# Patient Record
Sex: MALE | URBAN - METROPOLITAN AREA
[De-identification: names, ages, dates, MRNs, and addresses within clinical notes are randomized per-mention and may not be internally consistent; named-entity substitution may affect disease eponyms.]

---

## 2017-11-07 ENCOUNTER — HOSPITAL ENCOUNTER (EMERGENCY)
Facility: MEDICAL CENTER | Age: 27
End: 2017-11-07
Attending: EMERGENCY MEDICINE

## 2017-11-07 VITALS
HEIGHT: 72 IN | WEIGHT: 152.78 LBS | RESPIRATION RATE: 17 BRPM | TEMPERATURE: 98.1 F | BODY MASS INDEX: 20.69 KG/M2 | DIASTOLIC BLOOD PRESSURE: 84 MMHG | OXYGEN SATURATION: 99 % | HEART RATE: 105 BPM | SYSTOLIC BLOOD PRESSURE: 112 MMHG

## 2017-11-07 DIAGNOSIS — Z76.5 DRUG-SEEKING BEHAVIOR: ICD-10-CM

## 2017-11-07 DIAGNOSIS — K02.9 DENTAL CARIES: ICD-10-CM

## 2017-11-07 PROCEDURE — 99282 EMERGENCY DEPT VISIT SF MDM: CPT | Mod: EDC

## 2017-11-07 RX ORDER — AMOXICILLIN 500 MG/1
500 CAPSULE ORAL 3 TIMES DAILY
Qty: 21 CAP | Refills: 0 | Status: SHIPPED | OUTPATIENT
Start: 2017-11-07 | End: 2017-11-14

## 2017-11-07 ASSESSMENT — PAIN SCALES - GENERAL: PAINLEVEL_OUTOF10: 8

## 2017-11-07 NOTE — ED NOTES
Patient left before getting discharge vitals and paperwork, patient called and instructed to return to  his prescription for amoxicillin

## 2017-11-07 NOTE — ED NOTES
Nic Loya 26 y.o. male   Chief Complaint   Patient presents with   • Dental Pain     R upper broken wisdom tooth.          Pt returned to lobby and educated on triage process.  Advised to notify RN with changes or concerns.

## 2017-11-07 NOTE — ED PROVIDER NOTES
ED Provider Note      CHIEF COMPLAINT  Chief Complaint   Patient presents with   • Dental Pain     R upper broken wisdom tooth.         HPI  Nic Loya is a 26 y.o. male who presents with dental pain. A few days ago. Sees a dentist in [place]. Located right maxillary wisdom tooth. Throbbing worse with cold and heat as well as eating and drinking. Taking Tylenol and ibuprofen without improvement. No fever or swelling. Has not broken tooth. Immediately requests pain medication    REVIEW OF SYSTEMS    See HPI    PAST MEDICAL HISTORY  History reviewed. No pertinent past medical history.    SOCIAL HISTORY  Social History   Substance Use Topics   • Smoking status: Current Every Day Smoker     Packs/day: 0.50   • Smokeless tobacco: Current User   • Alcohol use Yes      Comment: social     He is from [place] going home today    ALLERGIES  No Known Allergies    PHYSICAL EXAM  VITAL SIGNS: /84   Pulse (!) 105   Temp 36.7 °C (98.1 °F)   Resp 17   Ht 1.829 m (6')   Wt 69.3 kg (152 lb 12.5 oz)   SpO2 99%   BMI 20.72 kg/m²   Constitutional: Well developed, Well nourished, No acute distress, Non-toxic appearance.   HENT:  Atraumatic, Normocephalic. Bilateral external ears normal, Oropharynx several dental caries most notably an eroded right maxillary wisdom tooth. There is no surrounding swelling  Eyes: Grossly Normal inspection  Neck: Normal range of motion  Cardiovascular: Normal heart rate  Thorax & Lungs: No respiratory distress  Skin: No rash.     COURSE & MEDICAL DECISION MAKING  Patient arrives to the ER complaining of dental pain and immediately requests pain medication. He does not have any swelling. This obviously is not a problem for some time given the appearance of this cavity. There may be some degree of infection associated. I have given him a prescription for amoxicillin. I advised continued ibuprofen and Tylenol. He should see his dentist when he gets home tomorrow.    Apparently  the patient got up and walked out of the emergency department without his antibiotic prescription    FINAL IMPRESSION  1. dental caries  2. Drug-seeking behavior      This dictation was created using voice recognition software. The accuracy of the dictation is limited to the abilities of the software.   The nursing notes were reviewed and certain aspects of this information were incorporated into this note.      Electronically signed by: Kvng Aguiar, 11/7/2017 1:08 PM

## 2017-11-07 NOTE — ED NOTES
First contact with patient, patient changing into gown, no current distress, patient alert/oriented-will continue to monitor

## 2017-11-07 NOTE — DISCHARGE INSTRUCTIONS
Dental Caries  Dental caries is tooth decay. This decay can cause a hole in teeth (cavity) that can get bigger and deeper over time.  HOME CARE  · Brush and floss your teeth. Do this at least two times a day.  · Use a fluoride toothpaste.  · Use a mouth rinse if told by your dentist or doctor.  · Eat less sugary and starchy foods. Drink less sugary drinks.  · Avoid snacking often on sugary and starchy foods. Avoid sipping often on sugary drinks.  · Keep regular checkups and cleanings with your dentist.  · Use fluoride supplements if told by your dentist or doctor.  · Allow fluoride to be applied to teeth if told by your dentist or doctor.     This information is not intended to replace advice given to you by your health care provider. Make sure you discuss any questions you have with your health care provider.     Document Released: 09/26/2009 Document Revised: 01/08/2016 Document Reviewed: 12/20/2013  [employer] Interactive Patient Education ©2016 [employer]

## 2018-07-03 ENCOUNTER — HOSPITAL ENCOUNTER (EMERGENCY)
Dept: HOSPITAL 8 - ED | Age: 28
Discharge: HOME | End: 2018-07-03
Payer: MEDICAID

## 2018-07-03 VITALS — SYSTOLIC BLOOD PRESSURE: 130 MMHG | DIASTOLIC BLOOD PRESSURE: 88 MMHG

## 2018-07-03 VITALS — WEIGHT: 154.32 LBS | BODY MASS INDEX: 20.9 KG/M2 | HEIGHT: 72 IN

## 2018-07-03 DIAGNOSIS — F10.10: Primary | ICD-10-CM

## 2018-07-03 PROCEDURE — 99283 EMERGENCY DEPT VISIT LOW MDM: CPT

## 2020-02-27 ENCOUNTER — HOSPITAL ENCOUNTER (EMERGENCY)
Dept: HOSPITAL 8 - ED | Age: 30
Discharge: HOME | End: 2020-02-27
Payer: MEDICAID

## 2020-02-27 VITALS — HEIGHT: 72 IN | BODY MASS INDEX: 23.83 KG/M2 | WEIGHT: 175.93 LBS

## 2020-02-27 VITALS — SYSTOLIC BLOOD PRESSURE: 125 MMHG | DIASTOLIC BLOOD PRESSURE: 81 MMHG
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
## 2020-02-27 DIAGNOSIS — S22.32XA: Primary | ICD-10-CM
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
## 2020-02-27 PROCEDURE — 99283 EMERGENCY DEPT VISIT LOW MDM: CPT

## 2020-04-26 ENCOUNTER — HOSPITAL ENCOUNTER (EMERGENCY)
Dept: HOSPITAL 8 - ED | Age: 30
LOS: 1 days | Discharge: HOME | End: 2020-04-27
Payer: MEDICAID

## 2020-04-26 VITALS — WEIGHT: 173.5 LBS | HEIGHT: 73 IN | BODY MASS INDEX: 22.99 KG/M2

## 2020-04-26 DIAGNOSIS — K28.9: Primary | ICD-10-CM

## 2020-04-26 DIAGNOSIS — Z90.49: ICD-10-CM

## 2020-04-26 PROCEDURE — 99283 EMERGENCY DEPT VISIT LOW MDM: CPT

## 2020-04-27 VITALS — DIASTOLIC BLOOD PRESSURE: 97 MMHG | SYSTOLIC BLOOD PRESSURE: 143 MMHG

## 2020-10-28 ENCOUNTER — HOSPITAL ENCOUNTER (EMERGENCY)
Dept: HOSPITAL 8 - ED | Age: 30
Discharge: LEFT BEFORE DISCHARGE | End: 2020-10-28
Payer: MEDICAID

## 2020-10-28 VITALS — BODY MASS INDEX: 22.85 KG/M2 | WEIGHT: 172.4 LBS | HEIGHT: 73 IN

## 2020-10-28 VITALS — SYSTOLIC BLOOD PRESSURE: 138 MMHG | DIASTOLIC BLOOD PRESSURE: 91 MMHG

## 2020-10-28 DIAGNOSIS — Z53.21: ICD-10-CM

## 2020-10-28 DIAGNOSIS — F10.10: Primary | ICD-10-CM

## 2020-10-29 ENCOUNTER — HOSPITAL ENCOUNTER (EMERGENCY)
Dept: HOSPITAL 8 - ED | Age: 30
Discharge: HOME | End: 2020-10-29
Payer: MEDICAID

## 2020-10-29 VITALS — BODY MASS INDEX: 23.23 KG/M2 | HEIGHT: 72 IN | WEIGHT: 171.52 LBS

## 2020-10-29 VITALS — DIASTOLIC BLOOD PRESSURE: 109 MMHG | SYSTOLIC BLOOD PRESSURE: 138 MMHG
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
## 2020-10-29 DIAGNOSIS — Z90.89: ICD-10-CM

## 2020-10-29 DIAGNOSIS — F10.229: Primary | ICD-10-CM

## 2020-10-29 DIAGNOSIS — F17.200: ICD-10-CM

## 2020-10-29 PROCEDURE — 99281 EMR DPT VST MAYX REQ PHY/QHP: CPT

## 2021-08-29 ENCOUNTER — HOSPITAL ENCOUNTER (EMERGENCY)
Dept: HOSPITAL 8 - ED | Age: 31
Discharge: HOME | End: 2021-08-29
Payer: MEDICAID

## 2021-08-29 VITALS — BODY MASS INDEX: 25.44 KG/M2 | HEIGHT: 72 IN | WEIGHT: 187.83 LBS

## 2021-08-29 VITALS — DIASTOLIC BLOOD PRESSURE: 93 MMHG | SYSTOLIC BLOOD PRESSURE: 142 MMHG
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
## 2021-08-29 DIAGNOSIS — F10.129: Primary | ICD-10-CM

## 2021-08-29 PROCEDURE — 99281 EMR DPT VST MAYX REQ PHY/QHP: CPT

## 2024-07-19 ENCOUNTER — APPOINTMENT (OUTPATIENT)
Dept: RADIOLOGY | Facility: MEDICAL CENTER | Age: 34
End: 2024-07-19
Attending: EMERGENCY MEDICINE

## 2024-07-19 ENCOUNTER — HOSPITAL ENCOUNTER (EMERGENCY)
Facility: MEDICAL CENTER | Age: 34
End: 2024-07-19
Attending: EMERGENCY MEDICINE

## 2024-07-19 VITALS
OXYGEN SATURATION: 96 % | BODY MASS INDEX: 22.62 KG/M2 | TEMPERATURE: 98.4 F | HEIGHT: 72 IN | HEART RATE: 83 BPM | RESPIRATION RATE: 14 BRPM | SYSTOLIC BLOOD PRESSURE: 119 MMHG | DIASTOLIC BLOOD PRESSURE: 79 MMHG | WEIGHT: 167 LBS

## 2024-07-19 DIAGNOSIS — R06.02 SHORTNESS OF BREATH: ICD-10-CM

## 2024-07-19 DIAGNOSIS — F15.929 METHAMPHETAMINE INTOXICATION (HCC): ICD-10-CM

## 2024-07-19 LAB — EKG IMPRESSION: NORMAL

## 2024-07-19 PROCEDURE — A9270 NON-COVERED ITEM OR SERVICE: HCPCS | Performed by: EMERGENCY MEDICINE

## 2024-07-19 PROCEDURE — 71045 X-RAY EXAM CHEST 1 VIEW: CPT

## 2024-07-19 PROCEDURE — 700102 HCHG RX REV CODE 250 W/ 637 OVERRIDE(OP): Performed by: EMERGENCY MEDICINE

## 2024-07-19 PROCEDURE — 99285 EMERGENCY DEPT VISIT HI MDM: CPT

## 2024-07-19 PROCEDURE — 93005 ELECTROCARDIOGRAM TRACING: CPT | Performed by: EMERGENCY MEDICINE

## 2024-07-19 RX ORDER — LORAZEPAM 1 MG/1
1 TABLET ORAL ONCE
Status: COMPLETED | OUTPATIENT
Start: 2024-07-19 | End: 2024-07-19

## 2024-07-19 RX ADMIN — LORAZEPAM 1 MG: 1 TABLET ORAL at 06:27

## 2024-07-19 ASSESSMENT — PAIN DESCRIPTION - PAIN TYPE: TYPE: ACUTE PAIN

## 2024-09-04 ENCOUNTER — HOSPITAL ENCOUNTER (EMERGENCY)
Facility: MEDICAL CENTER | Age: 34
End: 2024-09-04

## 2024-09-04 VITALS
TEMPERATURE: 98.1 F | HEART RATE: 100 BPM | SYSTOLIC BLOOD PRESSURE: 128 MMHG | RESPIRATION RATE: 16 BRPM | DIASTOLIC BLOOD PRESSURE: 88 MMHG | OXYGEN SATURATION: 96 %

## 2024-09-04 PROCEDURE — 302449 STATCHG TRIAGE ONLY (STATISTIC)

## 2024-11-15 ENCOUNTER — HOSPITAL ENCOUNTER (EMERGENCY)
Facility: MEDICAL CENTER | Age: 34
End: 2024-11-16
Attending: STUDENT IN AN ORGANIZED HEALTH CARE EDUCATION/TRAINING PROGRAM
Payer: COMMERCIAL

## 2024-11-15 DIAGNOSIS — F15.10 METHAMPHETAMINE ABUSE (HCC): ICD-10-CM

## 2024-11-15 PROCEDURE — 99284 EMERGENCY DEPT VISIT MOD MDM: CPT

## 2024-11-15 PROCEDURE — 93005 ELECTROCARDIOGRAM TRACING: CPT

## 2024-11-15 PROCEDURE — 93005 ELECTROCARDIOGRAM TRACING: CPT | Performed by: STUDENT IN AN ORGANIZED HEALTH CARE EDUCATION/TRAINING PROGRAM

## 2024-11-15 RX ORDER — DIAZEPAM 5 MG/1
5 TABLET ORAL ONCE
Status: COMPLETED | OUTPATIENT
Start: 2024-11-16 | End: 2024-11-16

## 2024-11-16 VITALS
TEMPERATURE: 96.8 F | BODY MASS INDEX: 25.06 KG/M2 | SYSTOLIC BLOOD PRESSURE: 132 MMHG | OXYGEN SATURATION: 95 % | RESPIRATION RATE: 20 BRPM | WEIGHT: 185 LBS | HEIGHT: 72 IN | DIASTOLIC BLOOD PRESSURE: 70 MMHG | HEART RATE: 96 BPM

## 2024-11-16 LAB — EKG IMPRESSION: NORMAL

## 2024-11-16 PROCEDURE — 700102 HCHG RX REV CODE 250 W/ 637 OVERRIDE(OP): Mod: UD | Performed by: STUDENT IN AN ORGANIZED HEALTH CARE EDUCATION/TRAINING PROGRAM

## 2024-11-16 PROCEDURE — A9270 NON-COVERED ITEM OR SERVICE: HCPCS | Mod: UD | Performed by: STUDENT IN AN ORGANIZED HEALTH CARE EDUCATION/TRAINING PROGRAM

## 2024-11-16 RX ADMIN — DIAZEPAM 5 MG: 5 TABLET ORAL at 00:16

## 2024-11-16 ASSESSMENT — PAIN DESCRIPTION - PAIN TYPE: TYPE: ACUTE PAIN

## 2024-11-16 NOTE — ED NOTES
Vital signs taken and recorded. Discharge in stable condition ambulatory accompanied by sister. Health teachings given to patient and family with full understanding of the information given. No personal belongings left.

## 2024-11-16 NOTE — ED TRIAGE NOTES
"Chief Complaint   Patient presents with    Shortness of Breath     Reports shortness of breath x 2 hours, Patient states \"my body is shutting down and I don't know what's happening\". Denies CP and fever. Patient admits using meth tonight and drinks alcohol. Denies SI/HI     BP (!) 150/98   Pulse (!) 105   Temp 36 °C (96.8 °F) (Temporal)   Resp 20   Ht 1.829 m (6')   Wt 83.9 kg (185 lb)   SpO2 98%   BMI 25.09 kg/m²     Pt came in to triage ambulatory with steady gait for the above complaints.     Pt is alert and oriented x 4, speaking in full sentences, follows commands and responds appropriately to questions.     Respirations are even and unlabored.    Pt placed in lobby. Pt educated on triage process.     Pt encouraged to inform staff for any changes in condition or if needs help while waiting to be room in.    "

## 2024-11-16 NOTE — DISCHARGE INSTRUCTIONS
We have given you contact information for a primary care clinic where you should follow-up.  Please be careful with drug use.

## 2024-11-16 NOTE — ED PROVIDER NOTES
"ER Provider Note    Scribed for Capo Nice D.o. by Mitchel Sparrow. 11/15/2024  11:43 PM    Primary Care Provider: Pcp Pt States None    CHIEF COMPLAINT   Chief Complaint   Patient presents with    Shortness of Breath     Reports shortness of breath x 2 hours, Patient states \"my body is shutting down and I don't know what's happening\". Denies CP and fever. Patient admits using meth tonight and drinks alcohol. Denies SI/HI     EXTERNAL RECORDS REVIEWED  Inpatient Notes The patient was seen on 7/19/24 for drug abuse.     HPI/ROS  LIMITATION TO HISTORY   Select: : None  OUTSIDE HISTORIAN(S):  None    Nic Loya is a 33 y.o. male who presents to the ED complaining of shortness of breath. The patient reports he started to experience a sudden shortness of breath 2 hours ago. He states \"my body is shutting down and I don't know what's happening\". Denies chest pain and fever. Patient admits smoking a marijuana pre-roll and believes something was in it that is causing his symptoms.     PAST MEDICAL HISTORY  History reviewed. No pertinent past medical history.    SURGICAL HISTORY  History reviewed. No pertinent surgical history.    FAMILY HISTORY  No family history on file.    SOCIAL HISTORY   reports that he has been smoking cigarettes. He uses smokeless tobacco. He reports current alcohol use. He reports current drug use. Drug: Inhaled.    CURRENT MEDICATIONS  Previous Medications    No medications on file       ALLERGIES  Patient has no known allergies.    PHYSICAL EXAM  BP (!) 150/98   Pulse (!) 105   Temp 36 °C (96.8 °F) (Temporal)   Resp 20   Ht 1.829 m (6')   Wt 83.9 kg (185 lb)   SpO2 98%   BMI 25.09 kg/m²   Constitutional: Alert in no apparent distress.  HENT: No signs of trauma, Bilateral external ears normal, Nose normal.   Eyes: Pupils are equal and reactive, Conjunctiva normal, Non-icteric.   Neck: Normal range of motion, No tenderness, Supple, No stridor.   Lymphatic: No " "lymphadenopathy noted.   Cardiovascular: Tachycardic, no murmurs.   Thorax & Lungs: Normal breath sounds, No respiratory distress, No wheezing, No chest tenderness.   Abdomen: Bowel sounds normal, Soft, No tenderness, No masses, No pulsatile masses.   Skin: Warm, Dry, No erythema, No rash.   Back: No bony tenderness, No CVA tenderness.   Extremities: Intact distal pulses, No edema, No tenderness, No cyanosis  Musculoskeletal: Good range of motion in all major joints. No tenderness to palpation or major deformities noted.   Neurologic: Alert , Normal motor function, Normal sensory function, No focal deficits noted.     DIAGNOSTIC STUDIES    EKG/LABS  Results for orders placed or performed during the hospital encounter of 11/15/24   EKG    Collection Time: 11/15/24 11:03 PM   Result Value Ref Range    Report       [facility] Emergency Dept.    Test Date:  2024-11-15  Pt Name:    REANNA CARCAMO         Department: ER  MRN:        [id number]                      Room:  Gender:     Male                         Technician: [id number]  :        1990                   Requested By:ER TRIAGE PROTOCOL  Order #:    [id number]                    Reading MD: Capo Nice    Measurements  Intervals                                Axis  Rate:       103                          P:          47  HI:         137                          QRS:        54  QRSD:       92                           T:          53  QT:         336  QTc:        440    Interpretive Statements  Interpreted by me: Sinus tachycardia, rate 103, no signs of acute ischemia  when compared to prior  Electronically Signed On 2024 01:25:03 PST by Capo Nice        I have independently interpreted this EKG        COURSE & MEDICAL DECISION MAKING     ASSESSMENT, COURSE AND PLAN  Care Narrative: Patient presenting with \"strange body sensation\" and anxiety after drug use.  No history exam to suggest ACS, PE, pneumonia, anemia " or infectious process.  Suspect sympathomimetic toxicity will dose patient with benzodiazepine and closely reassess his symptoms.    On reassessment tachycardia has resolved patient reports improvement in symptoms.  Discussed with patient caution in using illegal substances, close primary care follow-up and return precautions.  Patient does not appear to be acutely psychotic is not suicidal homicidal does not appear to be acute threat to himself or others.          DISPOSITION AND DISCUSSIONS    Escalation of care considered, and ultimately not performed: Laboratory analysis.    Barriers to care at this time, including but not limited to: Patient does not have established PCP.         FINAL DIANGOSIS  1. Methamphetamine abuse (HCC)      Mitchel VELÁZQUEZ (Dagoibe), am scribing for, and in the presence of, Capo Nice D.O    Electronically signed by: Mitchel Sparrow (Carloz), 11/15/2024    Capo VELÁZQUEZ D.O personally performed the services described in this documentation, as scribed by Mitchel Sparrow in my presence, and it is both accurate and complete.      The note accurately reflects work and decisions made by me.  Capo Nice D.O.  11/16/2024  1:28 AM

## 2024-11-16 NOTE — ED NOTES
Bedside report received from off going RN April, assumed care of patient.  POC discussed with patient. Call light within reach, all needs addressed at this time.       Fall risk interventions in place: Place fall risk sign on patient's door, Give patient urinal if applicable, Keep floor surfaces clean and dry, and Accompanied to restroom (all applicable per [place] Fall risk assessment)   Continuous monitoring: Not Applicable   IVF/IV medications: Not Applicable   Oxygen: Room Air  Bedside sitter: Not Applicable   Isolation: Not Applicable    Patient in bed, calm and cooperative

## 2024-11-28 ENCOUNTER — HOSPITAL ENCOUNTER (EMERGENCY)
Facility: MEDICAL CENTER | Age: 34
End: 2024-11-29
Attending: EMERGENCY MEDICINE
Payer: COMMERCIAL

## 2024-11-28 DIAGNOSIS — F10.920 ACUTE ALCOHOLIC INTOXICATION WITHOUT COMPLICATION (HCC): ICD-10-CM

## 2024-11-28 PROCEDURE — 99284 EMERGENCY DEPT VISIT MOD MDM: CPT

## 2024-11-29 VITALS
HEART RATE: 77 BPM | WEIGHT: 185 LBS | BODY MASS INDEX: 26.48 KG/M2 | OXYGEN SATURATION: 96 % | DIASTOLIC BLOOD PRESSURE: 54 MMHG | HEIGHT: 70 IN | RESPIRATION RATE: 19 BRPM | TEMPERATURE: 98.2 F | SYSTOLIC BLOOD PRESSURE: 113 MMHG

## 2024-11-29 NOTE — ED TRIAGE NOTES
"Chief Complaint   Patient presents with    Alcohol Intoxication     PD called after patient was intoxicated and tresspassing     /67   Pulse 79   Resp 18   Ht 1.778 m (5' 10\")   Wt 83.9 kg (185 lb)   SpO2 98%   BMI 26.54 kg/m²     33 y/o male presents to ED via EMS after PD was called when patient was found heavily intoxicated, trespassing at [facility]. Per EMS, patient has a hx of alcohol intoxication. Patient currently denies complaints. Limited hx available.    Slurred speech, smells of EtOH, appears disheveled.   "

## 2024-11-29 NOTE — ED NOTES
Bedside report received from previous shift.   Assumed patient care. Verified patient identification.  Checked on bed, connected to monitor,  with unlabored respirations. Discussed plan of care.   Vital signs is stable. Denied any new complaints.   Gurney in low position, side rail up for pt safety. Call light within reach.   No needs identified at the moment. Instructed to use call light when needed.    Contraptions: +Bed Alarm  Alert and Oriented: Intoxicated  Ambulatory: Not at the moment  Oxygen: None  Pending: MTF

## 2024-11-29 NOTE — ED PROVIDER NOTES
"ED Provider Note    CHIEF COMPLAINT  Chief Complaint   Patient presents with    Alcohol Intoxication     PD called after patient was intoxicated and tresspassing       EXTERNAL RECORDS REVIEWED  Other patient seen in our department 2 weeks ago for amphetamine abuse    HPI/ROS  LIMITATION TO HISTORY   Select: Intoxication  OUTSIDE HISTORIAN(S):  Law Enforcement patient was found trespassing at a grocery store intoxicated was brought here    Nic Loya is a 34 y.o. male who presents to the emergency department stating that he drank too much today.  He states he does not drink enough to go through withdrawals but he is too drunk to walk and then requested a blanket.  Has no acute complaints otherwise    PAST MEDICAL HISTORY   has a past medical history of Drug abuse (HCC).    SURGICAL HISTORY  patient denies any surgical history    FAMILY HISTORY  No family history on file.    SOCIAL HISTORY  Social History     Tobacco Use    Smoking status: Every Day     Current packs/day: 0.50     Types: Cigarettes    Smokeless tobacco: Current   Substance and Sexual Activity    Alcohol use: Yes     Comment: social    Drug use: Yes     Types: Inhaled     Comment: marijuana, meth    Sexual activity: Not on file       CURRENT MEDICATIONS  Home Medications    **Home medications have not yet been reviewed for this encounter**         ALLERGIES  No Known Allergies    PHYSICAL EXAM  VITAL SIGNS: /67   Pulse 79   Temp 36.4 °C (97.6 °F) (Temporal)   Resp 18   Ht 1.778 m (5' 10\")   Wt 83.9 kg (185 lb)   SpO2 98%   BMI 26.54 kg/m²    Pulse OX: Pulse Oxygen level is within normal limits on room air  Constitutional: Alert in no apparent distress.  Appears disheveled and smells of alcohol  HENT: Normocephalic, Atraumatic, MMM  Eyes: PERound. Conjunctiva normal, non-icteric.   Heart: Regular rate and rhythm, intact distal pulses   Lungs: Symmetrical movement, no resp distress   Abdomen: Non-tender, non-distended, " normal bowel sounds  EXT/Back no signs of trauma  Skin: Warm, Dry, No erythema, No rash.   Neurologic: Alert and oriented, Grossly non-focal.       COURSE & MEDICAL DECISION MAKING    ASSESSMENT, COURSE AND PLAN  Care Narrative:     Patient is a 34-year-old male presents to the emergency department intoxicated with inability to ambulate.  Has no acute complaints glucose within normal limits for EMS.  Just requesting further blankets there is no signs of trauma he is alert and oriented otherwise and will allow him to rest until he can metabolize and ambulate without assistance.      DISPOSITION AND DISCUSSIONS  ED OBS: Yes; I am placing the patient in to an observation status due to a diagnostic uncertainty as well as therapeutic intensity. Patient placed in observation status at 7:34 PM, 11/28/2024.     Observation plan is as follows: Metabolization and ambulation with tolerating p.o.'s      10:32 PM  Patient continues to sleep and rest comfortably        11:34 PM  Patient continues to sleep and rest comfortably    12:47 AM  Patient is still sleeping resting comfortably was signed out to my partner for evaluation after metabolization      I have discussed management of the patient with the following physicians and PATRICK's:  Angie      FINAL DIAGNOSIS  1. Acute alcoholic intoxication without complication (HCC)         Electronically signed by: Yue Gutierrez M.D., 11/28/2024 7:34 PM

## 2024-11-29 NOTE — DISCHARGE SUMMARY
"  ED Observation Discharge Summary    Patient:Reanna Loya  Patient : 1990  Patient MRN: [id number]  Patient PCP: Pcp Pt States None    Admit Date: 2024  Discharge Date and Time: 24 3:28 AM  Discharge Diagnosis:   1. Acute alcoholic intoxication without complication (HCC)        Discharge Attending: Viktoriya Vyas M.D.  Discharge Service: ED Observation    ED Course  Reanna is a 34 y.o. male who was evaluated at [facility] after he  states that he drank too much today.  He was seen by my colleague, Dr. Gutierrez.  Please refer to her H&P for full information.  His care was signed out to me pending metabolization of alcohol.    3:28 AM patient was reassessed.  He is awake, ambulatory.  He is tolerating p.o.  He is clinically sober.  Patient will be discharged home at this time.  He states that he will be going to his parents house tonight.  He was given MTM resources.  He is advised to return for any new or worsening symptoms.  Patient agreeable to discharge plan with no further questions.  Patient denies needing any alcohol cessation resources.      Discharge Exam:  /54   Pulse 77   Temp 36.8 °C (98.2 °F) (Temporal)   Resp 19   Ht 1.778 m (5' 10\")   Wt 83.9 kg (185 lb)   SpO2 96%   BMI 26.54 kg/m² .    Constitutional: Awake and alert. Nontoxic  HENT:  Grossly normal  Eyes: Grossly normal  Neck: Normal range of motion  Cardiovascular: Normal heart rate   Thorax & Lungs: No respiratory distress  Abdomen: Nontender  Skin:  No pathologic rash.   Extremities: Well perfused  Psychiatric: Affect normal    Labs  Results for orders placed or performed during the hospital encounter of 11/15/24   EKG    Collection Time: 11/15/24 11:03 PM   Result Value Ref Range    Report       [facility]    Test Date:  2024-11-15  Pt Name:    REANNA LOYA         Department: ER  MRN:        [id number]                      Room:  Gender:     Male          "                Technician: [id number]  :        1990                   Requested By:ER TRIAGE PROTOCOL  Order #:    [id number]                    Reading MD: Capo Nice    Measurements  Intervals                                Axis  Rate:       103                          P:          47  KY:         137                          QRS:        54  QRSD:       92                           T:          53  QT:         336  QTc:        440    Interpretive Statements  Interpreted by me: Sinus tachycardia, rate 103, no signs of acute ischemia  when compared to prior  Electronically Signed On 2024 01:25:03 PST by Capo Nice         Radiology  No orders to display       Medications:   New Prescriptions    No medications on file     My final assessment includes   1. Acute alcoholic intoxication without complication (HCC)        Upon Reevaluation, the patient's condition has: Improved; and will be discharged.    Patient discharged from ED Observation status at 328 (Time) 2024 (Date).     Total time spent on this ED Observation discharge encounter is < 30 Minutes    Electronically signed by: Viktoriya Vyas M.D., 2024 3:28 AM

## 2024-11-29 NOTE — ED NOTES
Pt tossing and turning in gurney, RR even and unlabored, appears to be resting, NAD. Pt has no additional requests at this time.

## 2025-01-29 ENCOUNTER — APPOINTMENT (OUTPATIENT)
Dept: RADIOLOGY | Facility: MEDICAL CENTER | Age: 35
End: 2025-01-29
Attending: STUDENT IN AN ORGANIZED HEALTH CARE EDUCATION/TRAINING PROGRAM
Payer: COMMERCIAL

## 2025-01-29 ENCOUNTER — HOSPITAL ENCOUNTER (EMERGENCY)
Facility: MEDICAL CENTER | Age: 35
End: 2025-01-29
Attending: STUDENT IN AN ORGANIZED HEALTH CARE EDUCATION/TRAINING PROGRAM
Payer: COMMERCIAL

## 2025-01-29 VITALS
RESPIRATION RATE: 23 BRPM | HEIGHT: 72 IN | WEIGHT: 175 LBS | BODY MASS INDEX: 23.7 KG/M2 | DIASTOLIC BLOOD PRESSURE: 77 MMHG | TEMPERATURE: 97.9 F | OXYGEN SATURATION: 97 % | HEART RATE: 88 BPM | SYSTOLIC BLOOD PRESSURE: 145 MMHG

## 2025-01-29 DIAGNOSIS — R06.02 SHORTNESS OF BREATH: ICD-10-CM

## 2025-01-29 DIAGNOSIS — F15.10: ICD-10-CM

## 2025-01-29 PROCEDURE — 99284 EMERGENCY DEPT VISIT MOD MDM: CPT

## 2025-01-29 PROCEDURE — A9270 NON-COVERED ITEM OR SERVICE: HCPCS | Mod: UD | Performed by: STUDENT IN AN ORGANIZED HEALTH CARE EDUCATION/TRAINING PROGRAM

## 2025-01-29 PROCEDURE — 71045 X-RAY EXAM CHEST 1 VIEW: CPT

## 2025-01-29 PROCEDURE — 700102 HCHG RX REV CODE 250 W/ 637 OVERRIDE(OP): Mod: UD | Performed by: STUDENT IN AN ORGANIZED HEALTH CARE EDUCATION/TRAINING PROGRAM

## 2025-01-29 RX ORDER — LORAZEPAM 1 MG/1
TABLET ORAL
Status: DISPENSED
Start: 2025-01-29 | End: 2025-01-29

## 2025-01-29 RX ORDER — LORAZEPAM 1 MG/1
1 TABLET ORAL ONCE
Status: COMPLETED | OUTPATIENT
Start: 2025-01-29 | End: 2025-01-29

## 2025-01-29 RX ADMIN — LORAZEPAM 1 MG: 1 TABLET ORAL at 02:25

## 2025-01-29 NOTE — ED TRIAGE NOTES
Chief Complaint   Patient presents with    Hand Pain     BIBA from [facility]. Patient found wandering around. Pt is exhibiting abnormal behavior. Pupils are large and reactive. Pt is very concerned about his backpavck. Pt states he's right hand is tingling. CMS intact. Pt is tachypnic.

## 2025-01-29 NOTE — ED PROVIDER NOTES
ER Provider Note    Scribed for Chay Storm M.D. by Jose Antonio Winkler. 1/29/2025   4:35 AM    Primary Care Provider: Pcp Pt States None    CHIEF COMPLAINT  Chief Complaint   Patient presents with    Hand Pain     [place]. Patient found wandering around. Pt is exhibiting abnormal behavior. Pupils are large and reactive. Pt is very concerned about his backpavck. Pt states he's right hand is tingling. CMS intact. Pt is tachypnic.     EXTERNAL RECORDS REVIEWED  Other multiple ED presentations for sympathomimetic use    HPI/ROS  LIMITATION TO HISTORY   Select: : None  OUTSIDE HISTORIAN(S):  None    Nic Loya is a 35 year old man man who presents for evaluation of trouble breathing onset one hour ago. He reports previous episodes of similar symtpoms.The patient reports he is currently homeless and does not [facility]. He denies cough. He denies a history of MI. He denies smoking.     PAST MEDICAL HISTORY  Past Medical History:   Diagnosis Date    Drug abuse (HCC)      SURGICAL HISTORY  No past surgical history noted.    FAMILY HISTORY  No family history noted.    SOCIAL HISTORY   reports that he has been smoking cigarettes. He uses smokeless tobacco. He reports current alcohol use. He reports current drug use. Drug: Inhaled.    CURRENT MEDICATIONS  There are no discharge medications for this patient.    ALLERGIES  No Known Allergies     PHYSICAL EXAM  BP (!) 145/77   Pulse 88   Temp 36.6 °C (97.9 °F) (Temporal)   Resp (!) 23   Ht 1.829 m (6')   Wt 79.4 kg (175 lb)   SpO2 97%   BMI 23.73 kg/m²    General: Anxious appearing, disheveled  Head: Normocephalic atraumatic  Eyes: Extraocular motion intact  Neck: Supple, no rigidity  Cardiovascular: Regular rate and rhythm no murmurs rubs or gallops  Respiratory: Clear to auscultation bilaterally, equal chest rise and fall, no increased work of breathing, slightly tachypnic.   Abdomen: Soft nontender no guarding  Musculoskeletal: Warm and well  perfused, no peripheral edema  Neuro: Alert, no focal deficits  Integumentary: No wounds or rashes     DIAGNOSTIC STUDIES      EKG:  EKG showed normal sinus rhythm, no ST changes, no ischemic changes.    Secondary to chart limitations in the setting of EMR downtime, EKG was not available for upload in real-time.  I did review it personally    Radiology:   This attending emergency physician has independently interpreted the diagnostic imaging associated with this visit and is awaiting the final reading from the radiologist.   Preliminary interpretation is a follows: No acute process    Radiologist interpretation:   DX-CHEST-LIMITED (1 VIEW)   Final Result      No acute process.         INITIAL ASSESSMENT COURSE AND PLAN  Care Narrative     2:10 AM - Patient was evaluated at bedside. They present for trouble breathing. Pertinent PE findings include: slight tachypnea, but lungs clear to auscultation. Differential diagnoses include but not limited to: Pneumonia, asthma, sympathomimetic use,   low suspicion for ischemia, PE based on nature of presentation, lack of risk factors    2:45 AM - Reviewed CXR which appears normal. Plan for discharge. The patient will be provided with written discharge instructions.  Patient feels better after Ativan.  Vital signs remained stable, respiratory rate has improved, he is tolerating p.o.  He feels ready for discharge       This chart was created during medical record downtime which limited chart review.     Reviewed patient's imaging results at the bedside, patient is agreeable to discharge, we discussed strict return precautions, and outpatient follow-up, patient was discharged in no acute distress.  All questions were answered prior to discharge.    DISPOSITION AND DISCUSSIONS    I have discussed management of the patient with the following physicians and PATRICK's:  None    Discussion of management with other QHP or appropriate source(s): None     Escalation of care considered, and  ultimately not performed: Laboratory analysis.    Barriers to care at this time, including but not limited to: Patient does not have established PCP, Patient is homeless, and Patient lacks transportation .       The patient will return for new or worsening symptoms and is stable at the time of discharge.    The patient is referred to a primary physician for blood pressure management, diabetic screening, and for all other preventative health concerns.    DISPOSITION:  Patient will be discharged home in stable condition.    FOLLOW UP:  [address] [address]  [phone]  Schedule an appointment as soon as possible for a visit     OUTPATIENT MEDICATIONS:  There are no discharge medications for this patient.    FINAL DIAGNOSIS  1. Amphetamine or related acting sympathomimetic abuse (HCC)    2. Shortness of breath      Jose Antonio VELÁZQUEZ (Carloz), am scribing for, and in the presence of, Jace Storm M.D..    Electronically signed by: Jose Antonio Winkler (Carloz), 1/29/2025    Jace VELÁZQUEZ M.D. personally performed the services described in this documentation, as scribed by Jose Antonio Winkler in my presence, and it is both accurate and complete.      The note accurately reflects work and decisions made by me.  Jace Storm M.D.  1/29/2025  6:34 AM

## 2025-03-23 ENCOUNTER — APPOINTMENT (OUTPATIENT)
Dept: RADIOLOGY | Facility: MEDICAL CENTER | Age: 35
End: 2025-03-23
Attending: EMERGENCY MEDICINE
Payer: COMMERCIAL

## 2025-03-23 ENCOUNTER — HOSPITAL ENCOUNTER (EMERGENCY)
Facility: MEDICAL CENTER | Age: 35
End: 2025-03-23
Attending: EMERGENCY MEDICINE
Payer: COMMERCIAL

## 2025-03-23 VITALS
HEART RATE: 66 BPM | WEIGHT: 160 LBS | TEMPERATURE: 98.6 F | BODY MASS INDEX: 21.67 KG/M2 | DIASTOLIC BLOOD PRESSURE: 84 MMHG | OXYGEN SATURATION: 99 % | RESPIRATION RATE: 19 BRPM | HEIGHT: 72 IN | SYSTOLIC BLOOD PRESSURE: 126 MMHG

## 2025-03-23 DIAGNOSIS — R05.9 COUGH, UNSPECIFIED TYPE: ICD-10-CM

## 2025-03-23 DIAGNOSIS — R74.01 TRANSAMINITIS: ICD-10-CM

## 2025-03-23 LAB
ALBUMIN SERPL BCP-MCNC: 3.9 G/DL (ref 3.2–4.9)
ALBUMIN/GLOB SERPL: 1.3 G/DL
ALP SERPL-CCNC: 106 U/L (ref 30–99)
ALT SERPL-CCNC: 61 U/L (ref 2–50)
ANION GAP SERPL CALC-SCNC: 14 MMOL/L (ref 7–16)
AST SERPL-CCNC: 49 U/L (ref 12–45)
BASOPHILS # BLD AUTO: 0.7 % (ref 0–1.8)
BASOPHILS # BLD: 0.06 K/UL (ref 0–0.12)
BILIRUB SERPL-MCNC: 1.4 MG/DL (ref 0.1–1.5)
BUN SERPL-MCNC: 16 MG/DL (ref 8–22)
CALCIUM ALBUM COR SERPL-MCNC: 9.3 MG/DL (ref 8.5–10.5)
CALCIUM SERPL-MCNC: 9.2 MG/DL (ref 8.5–10.5)
CHLORIDE SERPL-SCNC: 104 MMOL/L (ref 96–112)
CO2 SERPL-SCNC: 23 MMOL/L (ref 20–33)
CREAT SERPL-MCNC: 0.9 MG/DL (ref 0.5–1.4)
D DIMER PPP IA.FEU-MCNC: <0.27 UG/ML (FEU) (ref 0–0.5)
EOSINOPHIL # BLD AUTO: 0.37 K/UL (ref 0–0.51)
EOSINOPHIL NFR BLD: 4.2 % (ref 0–6.9)
ERYTHROCYTE [DISTWIDTH] IN BLOOD BY AUTOMATED COUNT: 43.8 FL (ref 35.9–50)
ETHANOL BLD-MCNC: 12.4 MG/DL
GFR SERPLBLD CREATININE-BSD FMLA CKD-EPI: 113 ML/MIN/1.73 M 2
GLOBULIN SER CALC-MCNC: 3 G/DL (ref 1.9–3.5)
GLUCOSE SERPL-MCNC: 81 MG/DL (ref 65–99)
HCT VFR BLD AUTO: 46.8 % (ref 42–52)
HGB BLD-MCNC: 15.7 G/DL (ref 14–18)
HOLDING TUBE BB 8507: NORMAL
IMM GRANULOCYTES # BLD AUTO: 0.03 K/UL (ref 0–0.11)
IMM GRANULOCYTES NFR BLD AUTO: 0.3 % (ref 0–0.9)
LYMPHOCYTES # BLD AUTO: 2.32 K/UL (ref 1–4.8)
LYMPHOCYTES NFR BLD: 26.6 % (ref 22–41)
MCH RBC QN AUTO: 32 PG (ref 27–33)
MCHC RBC AUTO-ENTMCNC: 33.5 G/DL (ref 32.3–36.5)
MCV RBC AUTO: 95.3 FL (ref 81.4–97.8)
MONOCYTES # BLD AUTO: 0.86 K/UL (ref 0–0.85)
MONOCYTES NFR BLD AUTO: 9.9 % (ref 0–13.4)
NEUTROPHILS # BLD AUTO: 5.09 K/UL (ref 1.82–7.42)
NEUTROPHILS NFR BLD: 58.3 % (ref 44–72)
NRBC # BLD AUTO: 0 K/UL
NRBC BLD-RTO: 0 /100 WBC (ref 0–0.2)
PLATELET # BLD AUTO: 410 K/UL (ref 164–446)
PMV BLD AUTO: 9.2 FL (ref 9–12.9)
POTASSIUM SERPL-SCNC: 3.6 MMOL/L (ref 3.6–5.5)
PROT SERPL-MCNC: 6.9 G/DL (ref 6–8.2)
RBC # BLD AUTO: 4.91 M/UL (ref 4.7–6.1)
SODIUM SERPL-SCNC: 141 MMOL/L (ref 135–145)
WBC # BLD AUTO: 8.7 K/UL (ref 4.8–10.8)

## 2025-03-23 PROCEDURE — 36415 COLL VENOUS BLD VENIPUNCTURE: CPT

## 2025-03-23 PROCEDURE — 85025 COMPLETE CBC W/AUTO DIFF WBC: CPT

## 2025-03-23 PROCEDURE — 80053 COMPREHEN METABOLIC PANEL: CPT

## 2025-03-23 PROCEDURE — 99283 EMERGENCY DEPT VISIT LOW MDM: CPT

## 2025-03-23 PROCEDURE — 85379 FIBRIN DEGRADATION QUANT: CPT

## 2025-03-23 PROCEDURE — 82077 ASSAY SPEC XCP UR&BREATH IA: CPT

## 2025-03-23 PROCEDURE — 71045 X-RAY EXAM CHEST 1 VIEW: CPT

## 2025-03-23 NOTE — ED TRIAGE NOTES
Nic Loya  34 y.o. male  Chief Complaint   Patient presents with    Blood in Sputum     BIB [facility] for spitting up blood 20 mins prior to arrival. Denies SOB, CP or abdominal pain     Pt is GCS 15, speaking in full sentences, follows commands and responds appropriately to questions. Resp are even and unlabored.       Vitals:    03/23/25 0224   BP: 134/87   Pulse: 75   Resp: 18   Temp: 37 °C (98.6 °F)   SpO2: 98%

## 2025-03-23 NOTE — ED PROVIDER NOTES
"ED Provider Note    CHIEF COMPLAINT  Chief Complaint   Patient presents with    Blood in Sputum     BIB [facility] for spitting up blood 20 mins prior to arrival. Denies SOB, CP or abdominal pain       EXTERNAL RECORDS REVIEWED  Inpatient Notes patient seen in this ER 1/29/2025 for amphetamine use  Additionally seen 11/28/2024 for alcohol intoxication  HPI/ROS  LIMITATION TO HISTORY   Select: : None  OUTSIDE HISTORIAN(S):  none    Nic Loya is a 34 y.o. male who presents to the emergency department stating \"I feel like my lungs are filling up \".  Admits to alcohol earlier today but denies other substance use.  Please see chart review as above for prior history of documented drug use to include amphetamines.    Denies states he was eating up [place] when he noticed the abnormal sensation and brief period of what he believes was hemoptysis.  Denies prior history of the same.    PAST MEDICAL HISTORY   has a past medical history of Drug abuse (HCC).    SURGICAL HISTORY  patient denies any surgical history    FAMILY HISTORY  No family history on file.    SOCIAL HISTORY  Social History     Tobacco Use    Smoking status: Every Day     Current packs/day: 0.50     Types: Cigarettes    Smokeless tobacco: Current   Substance and Sexual Activity    Alcohol use: Yes     Comment: social    Drug use: Yes     Types: Inhaled     Comment: marijuana, meth    Sexual activity: Not on file       CURRENT MEDICATIONS  Home Medications       Reviewed by Ambar Jose R.N. (Registered Nurse) on 03/23/25 at 0225  Med List Status: Partial     Medication Last Dose Status        Patient Francesco Taking any Medications                         Audit from Redirected Encounters    **Home medications have not yet been reviewed for this encounter**         ALLERGIES  No Known Allergies    PHYSICAL EXAM  VITAL SIGNS: /84   Pulse 66   Temp 37 °C (98.6 °F) (Temporal)   Resp 19   Ht 1.829 m (6')   Wt 72.6 kg (160 lb) "   SpO2 99%   BMI 21.70 kg/m²          Pulse ox interpretation: I interpret this pulse ox as normal.  Constitutional: Alert in no apparent distress.  HENT: No signs of trauma, Bilateral external ears normal, Nose normal.   Eyes: Pupils are equal and reactive  Neck: Normal range of motion, No tenderness, Supple  Cardiovascular: Regular rate and rhythm, no murmurs.   Thorax & Lungs: Normal breath sounds, No respiratory distress, No wheezing  Abdomen: Bowel sounds normal, Soft, No tenderness  Skin: Warm, Dry, No erythema, No rash.   Musculoskeletal: Good range of motion in all major joints. No tenderness to palpation or major deformities noted.   Neurologic: Alert , Normal motor function, Normal sensory function, No focal deficits noted.   Psychiatric: Affect normal, Judgment normal, Mood normal.         EKG/LABS  Results for orders placed or performed during the hospital encounter of 03/23/25   CBC WITH DIFFERENTIAL    Collection Time: 03/23/25  2:25 AM   Result Value Ref Range    WBC 8.7 4.8 - 10.8 K/uL    RBC 4.91 4.70 - 6.10 M/uL    Hemoglobin 15.7 14.0 - 18.0 g/dL    Hematocrit 46.8 42.0 - 52.0 %    MCV 95.3 81.4 - 97.8 fL    MCH 32.0 27.0 - 33.0 pg    MCHC 33.5 32.3 - 36.5 g/dL    RDW 43.8 35.9 - 50.0 fL    Platelet Count 410 164 - 446 K/uL    MPV 9.2 9.0 - 12.9 fL    Neutrophils-Polys 58.30 44.00 - 72.00 %    Lymphocytes 26.60 22.00 - 41.00 %    Monocytes 9.90 0.00 - 13.40 %    Eosinophils 4.20 0.00 - 6.90 %    Basophils 0.70 0.00 - 1.80 %    Immature Granulocytes 0.30 0.00 - 0.90 %    Nucleated RBC 0.00 0.00 - 0.20 /100 WBC    Neutrophils (Absolute) 5.09 1.82 - 7.42 K/uL    Lymphs (Absolute) 2.32 1.00 - 4.80 K/uL    Monos (Absolute) 0.86 (H) 0.00 - 0.85 K/uL    Eos (Absolute) 0.37 0.00 - 0.51 K/uL    Baso (Absolute) 0.06 0.00 - 0.12 K/uL    Immature Granulocytes (abs) 0.03 0.00 - 0.11 K/uL    NRBC (Absolute) 0.00 K/uL   COMP METABOLIC PANEL    Collection Time: 03/23/25  2:25 AM   Result Value Ref Range     Sodium 141 135 - 145 mmol/L    Potassium 3.6 3.6 - 5.5 mmol/L    Chloride 104 96 - 112 mmol/L    Co2 23 20 - 33 mmol/L    Anion Gap 14.0 7.0 - 16.0    Glucose 81 65 - 99 mg/dL    Bun 16 8 - 22 mg/dL    Creatinine 0.90 0.50 - 1.40 mg/dL    Calcium 9.2 8.5 - 10.5 mg/dL    Correct Calcium 9.3 8.5 - 10.5 mg/dL    AST(SGOT) 49 (H) 12 - 45 U/L    ALT(SGPT) 61 (H) 2 - 50 U/L    Alkaline Phosphatase 106 (H) 30 - 99 U/L    Total Bilirubin 1.4 0.1 - 1.5 mg/dL    Albumin 3.9 3.2 - 4.9 g/dL    Total Protein 6.9 6.0 - 8.2 g/dL    Globulin 3.0 1.9 - 3.5 g/dL    A-G Ratio 1.3 g/dL   DIAGNOSTIC ALCOHOL    Collection Time: 03/23/25  2:25 AM   Result Value Ref Range    Diagnostic Alcohol 12.4 (H) <10.1 mg/dL   D-DIMER    Collection Time: 03/23/25  2:25 AM   Result Value Ref Range    D-Dimer <0.27 0.00 - 0.50 ug/mL (FEU)   ESTIMATED GFR    Collection Time: 03/23/25  2:25 AM   Result Value Ref Range    GFR (CKD-EPI) 113 >60 mL/min/1.73 m 2       I have independently interpreted this EKG    RADIOLOGY/PROCEDURES   I have independently interpreted the diagnostic imaging associated with this visit and am waiting the final reading from the radiologist.   My preliminary interpretation is as follows: No consolidative process    Radiologist interpretation:  DX-CHEST-PORTABLE (1 VIEW)   Final Result      No acute cardiac or pulmonary abnormalities are identified.          COURSE & MEDICAL DECISION MAKING    ASSESSMENT, COURSE AND PLAN  Care Narrative: 34-year-old male presenting emerged part with above presentation.  Will complete labs and x-ray imaging.  Current physical exam is benign other than the fact that the patient appears mildly unkempt    DISPOSITION AND DISCUSSIONS  I have discussed management of the patient with the following physicians and PATRICK's: None    34-year-old male presenting emerged part with above presentation.  Labs as above significant for very minimal transaminitis.  Patient does have a history of ongoing alcohol use  likely driving this.  Furthermore chart review revealed that the patient has history of amphetamine abuse.  I believe this may be contributory as well tonight although he denies current use.  At this point I do not find any concerning features from a potential hemoptysis standpoint.  He has not coughed nor produce any sputum while here for testing.  Patient is not anemic and platelets are appropriate.    Mu Morrell M.D. spent greater than 3 minutes with the patient explaining the importance of smoking cessation.       FINAL DIAGNOSIS  1. Cough, unspecified type    2. Transaminitis         Electronically signed by: Mu Morrell M.D., 3/23/2025 2:35 AM

## 2025-07-11 ENCOUNTER — HOSPITAL ENCOUNTER (EMERGENCY)
Facility: MEDICAL CENTER | Age: 35
End: 2025-07-11
Attending: STUDENT IN AN ORGANIZED HEALTH CARE EDUCATION/TRAINING PROGRAM
Payer: COMMERCIAL

## 2025-07-11 VITALS
DIASTOLIC BLOOD PRESSURE: 79 MMHG | RESPIRATION RATE: 16 BRPM | HEART RATE: 93 BPM | SYSTOLIC BLOOD PRESSURE: 152 MMHG | WEIGHT: 180 LBS | HEIGHT: 72 IN | BODY MASS INDEX: 24.38 KG/M2 | TEMPERATURE: 98 F | OXYGEN SATURATION: 95 %

## 2025-07-11 DIAGNOSIS — F15.10 METHAMPHETAMINE USE (HCC): Primary | ICD-10-CM

## 2025-07-11 DIAGNOSIS — Z59.00 HOMELESSNESS: ICD-10-CM

## 2025-07-11 PROCEDURE — 99283 EMERGENCY DEPT VISIT LOW MDM: CPT

## 2025-07-11 SDOH — ECONOMIC STABILITY - HOUSING INSECURITY: HOMELESSNESS UNSPECIFIED: Z59.00

## 2025-07-11 ASSESSMENT — FIBROSIS 4 INDEX: FIB4 SCORE: 0.55

## 2025-07-12 NOTE — ED NOTES
Pt to bathroom unassisted with a steady gait. Urine collected and sent to lab. Pt reports no other needs at this time. Call light is within reach.

## 2025-07-12 NOTE — ED PROVIDER NOTES
ED Provider Note    CHIEF COMPLAINT  Chief Complaint   Patient presents with    Hyperventilating     BIB EMS from the street for hyperventilation x30 min. Pt states he tried meth for the first time this evening and has been hyperventilating since. Denies pain or other medical complaints.        EXTERNAL RECORDS REVIEWED  Patient was seen in the emergency room for trouble breathing January 20, 2025.  He had a chest x-ray done which was negative.  He was reportedly using methamphetamine during that time.    HPI/ROS  LIMITATION TO HISTORY   Select: : None  OUTSIDE HISTORIAN(S):  None    Nic Loya is a 34 y.o. male who presents for evaluation of difficulty breathing.  He was brought in by EMS due to hyperventilation for the past 30 minutes.  He states that he used meth for the first time and it caused him to breathe fast.  Patient states it is his first time using meth however he has been seen in the emergency room previously and was noted to be using methamphetamines.  He has no cough, chest pain, fevers, chills.  He has no history of lung problems.    PAST MEDICAL HISTORY   has a past medical history of Drug abuse (HCC).    SURGICAL HISTORY  patient denies any surgical history    FAMILY HISTORY  History reviewed. No pertinent family history.    SOCIAL HISTORY  Social History     Tobacco Use    Smoking status: Every Day     Current packs/day: 0.50     Types: Cigarettes    Smokeless tobacco: Current   Substance and Sexual Activity    Alcohol use: Yes     Comment: social    Drug use: Yes     Types: Inhaled     Comment: marijuana, meth    Sexual activity: Not on file       CURRENT MEDICATIONS  Home Medications       Reviewed by Albertina Enamorado R.N. (Registered Nurse) on 07/11/25 at 1913  Med List Status: Partial     Medication Last Dose Status        Patient Francesco Taking any Medications                           ALLERGIES  Allergies[1]    PHYSICAL EXAM  VITAL SIGNS: BP (!) 152/79   Pulse 93    Temp 36.7 °C (98 °F) (Temporal)   Resp 16   Ht 1.829 m (6')   Wt 81.6 kg (180 lb)   SpO2 95%   BMI 24.41 kg/m²      Constitutional: Unkempt  HENT: Normocephalic, Atraumatic  Cardiovascular: Tachycardic in triage however on my assessment, Normal heart rate, Normal rhythm, No murmurs, No rubs, No gallops.   Thorax & Lungs: Clear to auscultation bilaterally, No respiratory distress, No wheezing.  Abdomen: Soft nontender normal bowel sounds no rebound masses or peritoneal signs  Skin: Warm, Dry, No erythema, No rash.   Extremities: Intact distal pulses, No edema, No tenderness  Musculoskeletal: Good range of motion in all major joints. Normal gait.  Neurologic: Alert & oriented. No focal deficits noted.   Psych: Alert odd affect not responding to internal stimuli            COURSE & MEDICAL DECISION MAKING    ASSESSMENT, COURSE AND PLAN  Care Narrative:   This is a 34-year-old male who is presenting here for evaluation of hyperventilation that has since resolved.  Patient reports that he he was methamphetamines for the first time today however he has been seen in the emergency room for meth use previously.  On arrival, his vital signs are stable.  He was tachycardic in triage however by the time I evaluated the patient he was no longer tachycardic.  When I saw him, he was breathing normal.  He has no cough, he is not hypoxic.  Patient requesting to leave.  I think that this is reasonable.  I do think that his presentation is likely secondary to methamphetamine use.  He does not want any cessation resources.  Advised follow-up with his primary care doctor return for any new or worsening symptoms.  Patient is agreeable discharge plan open present.              ADDITIONAL PROBLEMS MANAGED  None    DISPOSITION AND DISCUSSIONS  I have discussed management of the patient with the following physicians and PATRICK's:    None    Discussion of management with other Q or appropriate source(s): None     Escalation of care  considered, and ultimately not performed:Laboratory analysis and diagnostic imaging    Barriers to care at this time, including but not limited to: None.     Decision tools and prescription drugs considered including, but not limited to: None.     The patient will return for new or worsening symptoms and is stable at the time of discharge.    The patient is referred to a primary physician for blood pressure management, diabetic screening, and for all other preventative health concerns.        DISPOSITION:  Patient will be discharged home in stable condition.    FOLLOW UP:  Your regular doctor          [facility], Emergency Dept  [address] [place] [id number] [phone]          OUTPATIENT MEDICATIONS:  There are no discharge medications for this patient.      FINAL DIAGNOSIS  1. Methamphetamine use (HCC) Acute   2. Homelessness Acute        Electronically signed by: Viktoriya Vyas M.D., 7/11/2025 8:09 PM           [1] No Known Allergies

## 2025-07-12 NOTE — ED NOTES
"Pt standing at EOB, pt asked to remain seated and wait for ERP. Pt reports he \"feels better standing\". Pt educated on sitting for safety reasons, pt currently sitting EOB. Call light is within reach.  "

## 2025-07-12 NOTE — ED TRIAGE NOTES
Chief Complaint   Patient presents with    Hyperventilating     BIB EMS from the street for hyperventilation x30 min. Pt states he tried meth for the first time this evening and has been hyperventilating since. Denies pain or other medical complaints.       Hx of meth use on chart.

## 2025-07-12 NOTE — ED NOTES
Bedside report received from off going RN: Saurav, assumed care of patient.  POC discussed with patient. Call light within reach, all needs addressed at this time.       Fall risk interventions in place: Patient's personal possessions are with in their safe reach and Keep floor surfaces clean and dry (all applicable per [facility] Fall risk assessment)   Continuous monitoring: Pulse Ox or Blood Pressure  IVF/IV medications: Not Applicable   Oxygen: Room Air  Bedside sitter: Not Applicable   Isolation: Not Applicable

## 2025-07-12 NOTE — ED NOTES
Bedside report to ALMITA Clark. Plan of care discussed with patient. Bed locked and in lowest position. Call light available and within reach. Appropriate fall precautions in place. No distress noted. This RN removed from care.

## 2025-07-12 NOTE — ED NOTES
"Pt standing in doorway asking for way out. Pt asked to wait for discharge paperwork. Pt states\" I don't need it\". Pt ambulated to lobby with a steady gait. Pt left with all belongings.    "

## 2025-07-12 NOTE — ED NOTES
Pt found up walking around the room, pt removed vital signs equipment. Pt educated on need for vital signs monitoring, pt continues to refuse. Pt asked to sit on the bed for safety reasons, pt sitting.